# Patient Record
Sex: MALE | Race: WHITE | URBAN - NONMETROPOLITAN AREA
[De-identification: names, ages, dates, MRNs, and addresses within clinical notes are randomized per-mention and may not be internally consistent; named-entity substitution may affect disease eponyms.]

---

## 2020-03-05 ENCOUNTER — OFFICE VISIT (OUTPATIENT)
Dept: PSYCHIATRY | Age: 35
End: 2020-03-05
Payer: COMMERCIAL

## 2020-03-05 VITALS — BODY MASS INDEX: 28.98 KG/M2 | HEIGHT: 71 IN | WEIGHT: 207 LBS

## 2020-03-05 PROCEDURE — 90792 PSYCH DIAG EVAL W/MED SRVCS: CPT | Performed by: NURSE PRACTITIONER

## 2020-03-05 RX ORDER — ESCITALOPRAM OXALATE 5 MG/1
5 TABLET ORAL DAILY
Qty: 30 TABLET | Refills: 1 | Status: SHIPPED | OUTPATIENT
Start: 2020-03-05 | End: 2020-04-09 | Stop reason: SDUPTHER

## 2020-03-05 RX ORDER — ESOMEPRAZOLE MAGNESIUM 40 MG/1
40 CAPSULE, DELAYED RELEASE ORAL
COMMUNITY

## 2020-03-05 RX ORDER — HYDROXYZINE PAMOATE 25 MG/1
25 CAPSULE ORAL 3 TIMES DAILY PRN
Qty: 90 CAPSULE | Refills: 0 | Status: SHIPPED | OUTPATIENT
Start: 2020-03-05 | End: 2021-04-01

## 2020-03-05 SDOH — ECONOMIC STABILITY: TRANSPORTATION INSECURITY
IN THE PAST 12 MONTHS, HAS THE LACK OF TRANSPORTATION KEPT YOU FROM MEDICAL APPOINTMENTS OR FROM GETTING MEDICATIONS?: NO

## 2020-03-05 SDOH — ECONOMIC STABILITY: FOOD INSECURITY: WITHIN THE PAST 12 MONTHS, THE FOOD YOU BOUGHT JUST DIDN'T LAST AND YOU DIDN'T HAVE MONEY TO GET MORE.: NEVER TRUE

## 2020-03-05 SDOH — ECONOMIC STABILITY: INCOME INSECURITY: HOW HARD IS IT FOR YOU TO PAY FOR THE VERY BASICS LIKE FOOD, HOUSING, MEDICAL CARE, AND HEATING?: NOT HARD AT ALL

## 2020-03-05 SDOH — ECONOMIC STABILITY: FOOD INSECURITY: WITHIN THE PAST 12 MONTHS, YOU WORRIED THAT YOUR FOOD WOULD RUN OUT BEFORE YOU GOT MONEY TO BUY MORE.: NEVER TRUE

## 2020-03-05 SDOH — ECONOMIC STABILITY: TRANSPORTATION INSECURITY
IN THE PAST 12 MONTHS, HAS LACK OF TRANSPORTATION KEPT YOU FROM MEETINGS, WORK, OR FROM GETTING THINGS NEEDED FOR DAILY LIVING?: NO

## 2020-03-05 ASSESSMENT — ANXIETY QUESTIONNAIRES
7. FEELING AFRAID AS IF SOMETHING AWFUL MIGHT HAPPEN: 1-SEVERAL DAYS
1. FEELING NERVOUS, ANXIOUS, OR ON EDGE: 3-NEARLY EVERY DAY
6. BECOMING EASILY ANNOYED OR IRRITABLE: 1-SEVERAL DAYS
2. NOT BEING ABLE TO STOP OR CONTROL WORRYING: 1-SEVERAL DAYS
4. TROUBLE RELAXING: 0-NOT AT ALL
GAD7 TOTAL SCORE: 7
5. BEING SO RESTLESS THAT IT IS HARD TO SIT STILL: 0-NOT AT ALL
3. WORRYING TOO MUCH ABOUT DIFFERENT THINGS: 1-SEVERAL DAYS

## 2020-03-05 ASSESSMENT — PATIENT HEALTH QUESTIONNAIRE - PHQ9
5. POOR APPETITE OR OVEREATING: 0
1. LITTLE INTEREST OR PLEASURE IN DOING THINGS: 1
2. FEELING DOWN, DEPRESSED OR HOPELESS: 0
7. TROUBLE CONCENTRATING ON THINGS, SUCH AS READING THE NEWSPAPER OR WATCHING TELEVISION: 1
SUM OF ALL RESPONSES TO PHQ QUESTIONS 1-9: 4
9. THOUGHTS THAT YOU WOULD BE BETTER OFF DEAD, OR OF HURTING YOURSELF: 0
SUM OF ALL RESPONSES TO PHQ QUESTIONS 1-9: 4
8. MOVING OR SPEAKING SO SLOWLY THAT OTHER PEOPLE COULD HAVE NOTICED. OR THE OPPOSITE, BEING SO FIGETY OR RESTLESS THAT YOU HAVE BEEN MOVING AROUND A LOT MORE THAN USUAL: 0
4. FEELING TIRED OR HAVING LITTLE ENERGY: 1
6. FEELING BAD ABOUT YOURSELF - OR THAT YOU ARE A FAILURE OR HAVE LET YOURSELF OR YOUR FAMILY DOWN: 0
SUM OF ALL RESPONSES TO PHQ9 QUESTIONS 1 & 2: 1
10. IF YOU CHECKED OFF ANY PROBLEMS, HOW DIFFICULT HAVE THESE PROBLEMS MADE IT FOR YOU TO DO YOUR WORK, TAKE CARE OF THINGS AT HOME, OR GET ALONG WITH OTHER PEOPLE: 1
3. TROUBLE FALLING OR STAYING ASLEEP: 1

## 2020-03-05 NOTE — PROGRESS NOTES
[address] [place] [address]  Dept: [phone]  Dept Fax: [phone]  Loc: [phone]    Visit Date: 3/5/2020    SUBJECTIVE DATA     CHIEF COMPLAINT:    Chief Complaint   Patient presents with    Anxiety    Panic Attack    New Patient    Psychiatric Evaluation       History obtained from: patient    HISTORY OF PRESENT ILLNESS:    Dion Watts is a 37 y.o. male who presents to the office with complaints of anxiety. 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 States he gets nervous and anxious driving on the highway  -thought it wouldn't be a good fit  -he started looking for a new job  -he was feeling very overwhelmed  -he wasn't eating very well  -during this same time he and his wife were scheduled to go to [facility]. States the night prior to leaving for [facility] he was unable to sleep and was very nervous so he cancelled the trip. He doesn't have a fear of flying or traveling. He has since rescheduled a trip to [place]. States he is still nervous and afraid that he will feel the same way again. The trip is scheduled for early May 2020.  States it is \"cyclical negative thinking\"    Things worked out with his job    Then later on then he had to travel for a short period to [place]  -for several weeks prior he had a lot of anxiety  -he convinced his wife to take the trip with him  -it was at this time he started seeking counseling with [facility]    Since that trip has been completed he has had mild anxiety on a daily basis  -at times things that shouldn't cause a reaction make him nervous and uncomfortable  -still able to go to work  -still able to complete daily tasks  -hasn't been as bad as it was last fall and over the summer    The \"phobia\" with driving started with driving the morning after he drank  -he would feel very hung-over  -would feel very tired  -late teens and early 25s  -would have nightmares about loosing control or falling asleep at the wheel and crashing  -since then   -shorter distances - less than an hour - \"aren't a big deal\"  -driving down a steep slope increases anxiety  -he is able to manage to drive, but \"it is very uncomfortable\"  -he has had two panic attacks while driving requiring him to pull over. The first time was in his early 25s. He was hung over and was driving home. It was a very windy day. Found self getting more tense. He pulled over. He couldn't move his hands until he was able to calm down. The 2nd time was a couple of years ago. Had to drive to [facility]. Was doing well initially, but at one point there was a very steep hill he had to travel down. The side \"were a sheer drop off\". States he was clenching his hands again and felt like he couldn't move. States he pulled over and had a panic attack. Recalls he was \"rittled with anxiety\"  -saw Dr. Michael Galeas for the driving issue years ago, but it continues to be a problem   -he dreads driving the highway  -states the more lanes and the more hills will \"stress me out\"  -no prior accidents  -something \"got into my head that it is more risky\"    Seeing the therapist helps \"put things into perspective\"  -there is still lingering anxiety in the background    Stressors at work make the anxiety worse  -feels more overwhelmed at times with that  -states \"I think I have been managing it pretty well\"    Jami Rush is that he can prevent these anxious feelings from coming up so much to help self-correct the negative thinking cycle.      In 2019 it was \"My worst year of my life\"  -father had medical issues  -2 of his close friends had significant problems - one had his wife leave unexpectedly and the other one had an unhealthy relationship end  -wife's parents have parents      Social History     Socioeconomic History    Marital status:      Spouse name: Eden Rivas Number of children: 2    Years of education: Not on file    Highest education level:  Bachelor's degree (e.g., BA, AB, BS)   Occupational History    Occupation: [employer]     Comment: health and    Social Needs    Financial resource strain: Not hard at all   ArtCorgi insecurity:     Worry: Never true     Inability: Never true   NoteWagon needs:     Medical: No     Non-medical: No   Tobacco Use    Smoking status: Current Every Day Smoker     Packs/day: 0.50     Types: Cigarettes     Start date: 2000    Smokeless tobacco: Never Used   Substance and Sexual Activity    Alcohol use: Yes     Comment: 2 or 3 drinks on a weekend - doesn't drink chantal weekend    Drug use: Not Currently     Types: Marijuana    Sexual activity: Yes     Partners: Female   Lifestyle    Physical activity:     Days per week: Not on file     Minutes per session: Not on file    Stress: Not on file   Relationships    Social connections:     Talks on phone: Not on file     Gets together: Not on file     Attends Mormonism service: Not on file     Active member of club or organization: Not on file     Attends meetings of clubs or organizations: Not on file     Relationship status: Not on file    Intimate partner violence:     Fear of current or ex partner: Not on file     Emotionally abused: Not on file     Physically abused: Not on file     Forced sexual activity: Not on file   Other Topics Concern    Not on file   Social History Narrative    3/5/2020    LEVEL OF EDUCATION: graduated high school; earned his bachelor's degree in environmental health    SPECIAL EDUCATION NEEDS: none    RESIDENCE: Currently lives with his wife and their children    LEGAL HISTORY: None    Moravian: None    TRAUMA: None    : None    HOBBIES: music, video games, time with friends    EMPLOYMENT: currently employed full-time at [employer], or of hurting yourself in some way: Not at all  If you checked off any problems, how difficult have these problems made it for you to do your work, take care of things at home, or get along with other people?: Somewhat difficult  PHQ-9 Completed?: Complete  PHQ-9 Total Score: 4     DIAGNOSIS AND ASSESSMENT DATA     DIAGNOSIS:   1. Generalized anxiety disorder        PLAN   Follow-up:  Return in about 4 weeks (around 4/2/2020), or if symptoms worsen or fail to improve, for follow-up and medication management. Prescriptions for this encounter:  New Prescriptions    ESCITALOPRAM (LEXAPRO) 5 MG TABLET    Take 1 tablet by mouth daily    HYDROXYZINE (VISTARIL) 25 MG CAPSULE    Take 1 capsule by mouth 3 times daily as needed for Anxiety       Orders Placed This Encounter   Medications    escitalopram (LEXAPRO) 5 MG tablet     Sig: Take 1 tablet by mouth daily     Dispense:  30 tablet     Refill:  1    hydrOXYzine (VISTARIL) 25 MG capsule     Sig: Take 1 capsule by mouth 3 times daily as needed for Anxiety     Dispense:  90 capsule     Refill:  0       There are no discontinued medications. Additional orders:  No orders of the defined types were placed in this encounter. Risks, potential side effects, possibledrug-drug interactions, benefits and alternate treatments discussed in detail. All questions answered. Patient stated understanding and is agreeable to treatment plan. Patient has been instructed to seek emergency help via the emergency and/or calling 911 should symptoms become severe, worsen, or with other concerning symptoms. Patient instructed to goimmediately to the emergency room and/or call 911 with any suicidal or homicidal ideations or if audio/visual hallucinations develop  Patient stated understanding and agrees. Patient given crisis center information.     I spent a total of 60 minutes with the patient and over half of that time was spent on counselingand coordination of care regarding

## 2020-04-09 ENCOUNTER — VIRTUAL VISIT (OUTPATIENT)
Dept: PSYCHIATRY | Age: 35
End: 2020-04-09
Payer: COMMERCIAL

## 2020-04-09 PROCEDURE — 99213 OFFICE O/P EST LOW 20 MIN: CPT | Performed by: NURSE PRACTITIONER

## 2020-04-09 RX ORDER — ESCITALOPRAM OXALATE 5 MG/1
5 TABLET ORAL DAILY
Qty: 30 TABLET | Refills: 1 | Status: SHIPPED | OUTPATIENT
Start: 2020-04-09 | End: 2020-06-10 | Stop reason: DRUGHIGH

## 2020-04-09 NOTE — PROGRESS NOTES
medications:  None      Current Psychiatric Review of Systems         Yvette or Hypomania:  no     Panic Attacks:  None since last visit     Phobias:  no     Obsessions and Compulsions:  no     Body or Vocal Tics:  no     Hallucinations:  no     Delusions:  no    SOCIAL HISTORY:  Patient was born in [place], [place] and raised by his biological parents      Social History     Socioeconomic History    Marital status:      Spouse name: Lolly Bui Number of children: 2    Years of education: Not on file    Highest education level:  Bachelor's degree (e.g., BA, AB, BS)   Occupational History    Occupation: [employer]     Comment: health and    Social Needs    Financial resource strain: Not hard at all   InSample insecurity     Worry: Never true     Inability: Never true   [employer] needs     Medical: No     Non-medical: No   Tobacco Use    Smoking status: Current Every Day Smoker     Packs/day: 0.50     Types: Cigarettes     Start date: 2000    Smokeless tobacco: Never Used   Substance and Sexual Activity    Alcohol use: Yes     Comment: 2 or 3 drinks on a weekend - doesn't drink chantal weekend    Drug use: Not Currently     Types: Marijuana    Sexual activity: Yes     Partners: Female   Lifestyle    Physical activity     Days per week: Not on file     Minutes per session: Not on file    Stress: Not on file   Relationships    Social connections     Talks on phone: Not on file     Gets together: Not on file     Attends Mandaen service: Not on file     Active member of club or organization: Not on file     Attends meetings of clubs or organizations: Not on file     Relationship status: Not on file    Intimate partner violence     Fear of current or ex partner: Not on file     Emotionally abused: Not on file     Physically abused: Not on file     Forced sexual activity: Not on file   Other Topics Concern    Not on file   Social History Narrative    3/5/2020    LEVEL OF EDUCATION: graduated high school; earned his bachelor's degree in environmental health    SPECIAL EDUCATION NEEDS: none    RESIDENCE: Currently lives with his wife and their children    LEGAL HISTORY: None    Evangelical: None    TRAUMA: None    : None    HOBBIES: music, video games, time with friends    EMPLOYMENT: 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 He is contracted through [employer] at [employer]. He is working day shift. He has been with [employer] since 10/2016. Prior to that he was working at [employer] for 5 years. MARRIAGES: one. He and his wife  5/10/2010    CHILDREN: two son    SUBSTANCE USE:    1. Marijuana: first use at around age 14. Last use was around 10/2019. States he would only use once every few months. 2. Alcohol: would consume 6-8 beers per sitting once per week but not weekly previously. Doesn't tolerate alcohol. States consumption of alcohol was never problematic or disruptive. Currently only drinks once or twice a month and will consume 2-3 drinks at the most.       FAMILY HISTORY:   Family History   Problem Relation Age of Onset    Depression Mother     Osteoarthritis Father     Irritable Bowel Syndrome Father     Depression Brother        Psychiatric Family History  As noted above    PAST MEDICAL HISTORY:    Past Medical History:   Diagnosis Date    GERD (gastroesophageal reflux disease)        PAST SURGICAL HISTORY:    History reviewed. No pertinent surgical history. PREVIOUSMEDICATIONS:  Outpatient Medications Prior to Visit   Medication Sig Dispense Refill    esomeprazole (NEXIUM) 40 MG delayed release capsule Take 40 mg by mouth every morning (before breakfast)      escitalopram (LEXAPRO) 5 MG tablet Take 1 tablet by mouth daily 30 tablet 1    hydrOXYzine (VISTARIL) 25 MG capsule Take 1 capsule by mouth 3 times daily as needed for Anxiety 90 capsule 0     No facility-administered medications prior to visit.         ALLERGIES:    Patient has no known

## 2020-06-10 ENCOUNTER — VIRTUAL VISIT (OUTPATIENT)
Dept: PSYCHIATRY | Age: 35
End: 2020-06-10
Payer: COMMERCIAL

## 2020-06-10 PROCEDURE — 99214 OFFICE O/P EST MOD 30 MIN: CPT | Performed by: NURSE PRACTITIONER

## 2020-06-10 RX ORDER — ESCITALOPRAM OXALATE 10 MG/1
10 TABLET ORAL DAILY
Qty: 30 TABLET | Refills: 1 | Status: SHIPPED | OUTPATIENT
Start: 2020-06-10 | End: 2020-07-15 | Stop reason: SDUPTHER

## 2020-06-10 NOTE — PROGRESS NOTES
when originally     Sleeping okay  -had some restless leg type symptoms but has started iron and tried the hydroxyzine, both of which have helped  -overall sleeping very well       Work has been going well  -he is back in the office      Denies suicidal ideations, intent, plan. No homicidal ideations, intent, plan. No audiovisual hallucinations. HPI    Adverse reactions from psychotropic medications:  None      Current Psychiatric Review of Systems         Yvette or Hypomania:  no     Panic Attacks:  None since last visit     Phobias:  no     Obsessions and Compulsions:  no     Body or Vocal Tics:  no     Hallucinations:  no     Delusions:  no    SOCIAL HISTORY:  Patient was born in [place], [place] and raised by his biological parents      Social History     Socioeconomic History    Marital status:      Spouse name: Linda Wilkes Number of children: 2    Years of education: Not on file    Highest education level:  Bachelor's degree (e.g., BA, AB, BS)   Occupational History    Occupation: [employer]     Comment: health and    Social Needs    Financial resource strain: Not hard at all   Smish insecurity     Worry: Never true     Inability: Never true   [employer] needs     Medical: No     Non-medical: No   Tobacco Use    Smoking status: Current Every Day Smoker     Packs/day: 0.50     Types: Cigarettes     Start date: 2000    Smokeless tobacco: Never Used   Substance and Sexual Activity    Alcohol use: Yes     Comment: 2 or 3 drinks on a weekend - doesn't drink chantal weekend    Drug use: Not Currently     Types: Marijuana    Sexual activity: Yes     Partners: Female   Lifestyle    Physical activity     Days per week: Not on file     Minutes per session: Not on file    Stress: Not on file   Relationships    Social connections     Talks on phone: Not on file     Gets together: Not on file     Attends Hindu service: Not on file     Active member of club or organization: Not on file tablet Take 1 tablet by mouth daily 30 tablet 1    esomeprazole (NEXIUM) 40 MG delayed release capsule Take 40 mg by mouth every morning (before breakfast)      hydrOXYzine (VISTARIL) 25 MG capsule Take 1 capsule by mouth 3 times daily as needed for Anxiety 90 capsule 0     No facility-administered medications prior to visit. ALLERGIES:    Patient has no known allergies. REVIEW OF SYSTEMS:    Review of Systems    The patient sees CINDY Almaraz CNP as his primary care provider. SPECIALISTS: None    OBJECTIVE DATA     There were no vitals taken for this visit. Physical Exam    Mental Status Evaluation:   Orientation: Alert, oriented, thought content appropriate   Mood:. Anxious      Affect:  Normal      Appearance:  Age Appropriate, Casually Dressed, Well Dressed, Clean, Well Groomed, Clothing Appropriate for Age and Clothing Appropriate for Weather   Activity:  Cooperative, Good Eye Contact and Seated Calmly   Gait/Posture: Normal   Speech:  Clear, Fluent, Normal Pitch and Volume, Age and Situation Appropriate   Thought Process: Within Normal Limits   Thought Content: Within Normal Limits   Cognition:  Grossly Intact   Memory: Intact   Insight:  Good   Judgment: Good   Suicidal Ideations: Denies Suicidal Ideation   Homicidal Ideations: Negative for homicidal ideation   Medication Side Effects: Absent       Attention Span Attention span and concentration were age appropriate       Screenings Completed in This Encounter:     Anxiety and Depression:                    DIAGNOSIS AND ASSESSMENT DATA     DIAGNOSIS:   1. Generalized anxiety disorder        PLAN   Follow-up:  Return in about 4 weeks (around 7/8/2020), or if symptoms worsen or fail to improve, for follow-up and medication management.     Prescriptions for this encounter:  New Prescriptions    No medications on file       Orders Placed This Encounter   Medications    escitalopram (LEXAPRO) 10 MG tablet     Sig: Take 1 tablet by

## 2020-07-15 ENCOUNTER — VIRTUAL VISIT (OUTPATIENT)
Dept: PSYCHIATRY | Age: 35
End: 2020-07-15
Payer: COMMERCIAL

## 2020-07-15 PROCEDURE — 99213 OFFICE O/P EST LOW 20 MIN: CPT | Performed by: NURSE PRACTITIONER

## 2020-07-15 RX ORDER — ESCITALOPRAM OXALATE 10 MG/1
10 TABLET ORAL DAILY
Qty: 30 TABLET | Refills: 2 | Status: SHIPPED | OUTPATIENT
Start: 2020-07-15 | End: 2020-10-08 | Stop reason: SDUPTHER

## 2020-07-15 NOTE — PROGRESS NOTES
[address] [facility] [address]  Dept: [phone]  Dept Fax: [phone]: [phone]    Visit Date: 7/15/2020    TELEPSYCHIATRY VISIT -- Audio/Visual (During SMUHV-59 public health emergency)     Winifred Jarquin is a 38 y.o. male being evaluated by a Virtual Visit (video visit) encounter to address concerns as mentioned  below. A caregiver was present when appropriate. Pursuant to the emergency declaration under the [facility], [address] [place] [address] authority and the [employer] Act, this Virtual Visit was conducted with patient's (and/or legal guardian's) consent, to reduce the patient's risk of exposure to COVID-19 and provide necessary medical care. The patient (and/or legal guardian) has also been advised to contact this office for worsening conditions or problems, and seek emergency medical treatment and/or call 911 if deemed necessary. Services were provided through a video synchronous discussion virtually to substitute for in-person clinic visit. Patient and provider were located at their individual homes. SUBJECTIVE DATA     CHIEF COMPLAINT:    Chief Complaint   Patient presents with    Anxiety    Follow-up       History obtained from: patient    HISTORY OF PRESENT ILLNESS:    Winifred Jarquin is a 38 y.o. male who presents to the office via telehealth video for follow-up on complaints of anxiety. Patient's last visit was 06/10/2020.     Patient states he has had a noticeable difference in symptom control with dose adjustment  -Less racing thoughts  -better able to focus  -anxiety is \"extremely manageable\"  -Anxiety was almost a daily struggle and would make things difficult, but that isn't occurring any more  -Better able to recognize the anxiety and control the anxiety easier  -Sometimes in the background there is a little bit of anxiety, but not bothersome  -Able to control the anxiety   -Not have any difficulty eating or sleeping  -Not overthinking  -Family has noticed some improvements  -Not concerned about traveling  -Better able to drive on the highway    Work has been busier  -when this first started before the dose adjustment he struggled more  -since the dose adjustment he has been able to complete all tasks and manage all work stressors easier    Denies suicidal ideations, intent, plan. No homicidal ideations, intent, plan. No audiovisual hallucinations. HPI    Adverse reactions from psychotropic medications:  None      Current Psychiatric Review of Systems         Yvette or Hypomania:  no     Panic Attacks:  None since last visit     Phobias:  no     Obsessions and Compulsions:  no     Body or Vocal Tics:  no     Hallucinations:  no     Delusions:  no    SOCIAL HISTORY:  Patient was born in [place], [place] and raised by his biological parents      Social History     Socioeconomic History    Marital status:      Spouse name: Charlette Burch Number of children: 2    Years of education: Not on file    Highest education level:  Bachelor's degree (e.g., BA, AB, BS)   Occupational History    Occupation: [employer]     Comment: health and    Social Needs    Financial resource strain: Not hard at all   [employer] insecurity     Worry: Never true     Inability: Never true   [employer] needs     Medical: No     Non-medical: No   Tobacco Use    Smoking status: Current Every Day Smoker     Packs/day: 0.50     Types: Cigarettes     Start date: 2000    Smokeless tobacco: Never Used   Substance and Sexual Activity    Alcohol use: Yes     Comment: 2 or 3 drinks on a weekend - doesn't drink chantal weekend    Drug use: Not Currently     Types: Marijuana    Sexual activity: Yes     Partners: Female   Lifestyle    Physical activity     Days per week: Not on file     Minutes per session: Not on file    Stress: Not on file Relationships    Social connections     Talks on phone: Not on file     Gets together: Not on file     Attends Taoism service: Not on file     Active member of club or organization: Not on file     Attends meetings of clubs or organizations: Not on file     Relationship status: Not on file    Intimate partner violence     Fear of current or ex partner: Not on file     Emotionally abused: Not on file     Physically abused: Not on file     Forced sexual activity: Not on file   Other Topics Concern    Not on file   Social History Narrative    3/5/2020    LEVEL OF EDUCATION: graduated high school; earned his bachelor's degree in environmental health    SPECIAL EDUCATION NEEDS: none    RESIDENCE: Currently lives with his wife and their children    LEGAL HISTORY: None    Taoism: None    TRAUMA: None    : None    HOBBIES: music, video games, time with friends    EMPLOYMENT: 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 He is contracted through [employer] at [employer]. He is working day shift. He has been with [employer] since 10/2016. Prior to that he was working at [employer] for 5 years. MARRIAGES: one. He and his wife  5/10/2010    CHILDREN: two son    SUBSTANCE USE:    1. Marijuana: first use at around age 14. Last use was around 10/2019. States he would only use once every few months. 2. Alcohol: would consume 6-8 beers per sitting once per week but not weekly previously. Doesn't tolerate alcohol. States consumption of alcohol was never problematic or disruptive.  Currently only drinks once or twice a month and will consume 2-3 drinks at the most.       FAMILY HISTORY:   Family History   Problem Relation Age of Onset    Depression Mother     Osteoarthritis Father     Irritable Bowel Syndrome Father     Depression Brother        Psychiatric Family History  As noted above    PAST MEDICAL HISTORY:    Past Medical History:   Diagnosis Date    GERD (gastroesophageal reflux disease)        PAST SURGICAL HISTORY:    History reviewed. No pertinent surgical history. PREVIOUSMEDICATIONS:  Outpatient Medications Prior to Visit   Medication Sig Dispense Refill    esomeprazole (NEXIUM) 40 MG delayed release capsule Take 40 mg by mouth every morning (before breakfast)      hydrOXYzine (VISTARIL) 25 MG capsule Take 1 capsule by mouth 3 times daily as needed for Anxiety 90 capsule 0    escitalopram (LEXAPRO) 10 MG tablet Take 1 tablet by mouth daily 30 tablet 1     No facility-administered medications prior to visit. ALLERGIES:    Patient has no known allergies. REVIEW OF SYSTEMS:    Review of Systems    The patient sees CINDY Benoit CNP as his primary care provider. SPECIALISTS: None    OBJECTIVE DATA     There were no vitals taken for this visit. Physical Exam    Mental Status Evaluation:   Orientation: Alert, oriented, thought content appropriate   Mood:. Euthymic      Affect:  Normal      Appearance:  Age Appropriate, Casually Dressed, Well Dressed, Clean, Well Groomed, Clothing Appropriate for Age and Clothing Appropriate for Weather   Activity:  Cooperative, Good Eye Contact and Seated Calmly   Gait/Posture: Normal   Speech:  Clear, Fluent, Normal Pitch and Volume, Age and Situation Appropriate   Thought Process: Within Normal Limits   Thought Content: Within Normal Limits   Cognition:  Grossly Intact   Memory: Intact   Insight:  Good   Judgment: Good   Suicidal Ideations: Denies Suicidal Ideation   Homicidal Ideations: Negative for homicidal ideation   Medication Side Effects: Absent       Attention Span Attention span and concentration were age appropriate       Screenings Completed in This Encounter:     Anxiety and Depression:                    DIAGNOSIS AND ASSESSMENT DATA     DIAGNOSIS:   1.  Generalized anxiety disorder        PLAN   Follow-up:  Return in about 3 months (around 10/15/2020), or if symptoms worsen or fail to improve, for follow-up and medication management. Prescriptions for this encounter:  New Prescriptions    No medications on file       Orders Placed This Encounter   Medications    escitalopram (LEXAPRO) 10 MG tablet     Sig: Take 1 tablet by mouth daily     Dispense:  30 tablet     Refill:  2       Medications Discontinued During This Encounter   Medication Reason    escitalopram (LEXAPRO) 10 MG tablet REORDER       Additional orders:  No orders of the defined types were placed in this encounter. Patient mood is doing very well per his report. He is tolerating medication without side effect and has noticed significant reduction in overall symptoms. Patient will continue with Lexapro 10 mg daily. Supportive therapy provided. Discussed the importance of participating in individual psychotherapy. Patient is encouraged to utilize nonpharmacologic coping skills such as deep breathing, guided imagery, guided meditation, muscle relaxation, calming music, and/or journaling. Risks, potential side effects, possibledrug-drug interactions, benefits and alternate treatments discussed in detail. All questions answered. Patient stated understanding and is agreeable to treatment plan. Patient has been instructed to seek emergency help via the emergency and/or calling 911 should symptoms become severe, worsen, or with other concerning symptoms. Patient instructed to goimmediately to the emergency room and/or call 911 with any suicidal or homicidal ideations or if audio/visual hallucinations develop  Patient stated understanding and agrees. Patient given crisis center information. I spent a total of 20 minutes with the patient and over half of that time was spent on counseling and coordination of care regarding topics discussed above. Provider Signature:  Electronically signed by CINDY Rincon CNP on 7/15/2020 at 12:05 PM    **This report has been created using voice recognition software.  It may contain minor errors which are inherent in voice recognition technology. **

## 2020-10-08 ENCOUNTER — VIRTUAL VISIT (OUTPATIENT)
Dept: PSYCHIATRY | Age: 35
End: 2020-10-08
Payer: COMMERCIAL

## 2020-10-08 PROCEDURE — 99213 OFFICE O/P EST LOW 20 MIN: CPT | Performed by: NURSE PRACTITIONER

## 2020-10-08 RX ORDER — ESCITALOPRAM OXALATE 10 MG/1
10 TABLET ORAL DAILY
Qty: 90 TABLET | Refills: 1 | Status: SHIPPED | OUTPATIENT
Start: 2020-10-08 | End: 2020-10-21 | Stop reason: SDUPTHER

## 2020-10-08 NOTE — PROGRESS NOTES
[address] [place] [address]  Dept: [phone]  Dept Fax: [phone]: [phone]    Visit Date: 10/8/2020    TELEPSYCHIATRY VISIT -- Audio/Visual (During AAFBN-55 public health emergency)     Charu Chavez is a 38 y.o. male being evaluated by a Virtual Visit (video visit) encounter to address concerns as mentioned  below. A caregiver was present when appropriate. Pursuant to the emergency declaration under the [address] [place] [address], [address] [place] [address] authority and the [employer] Act, this Virtual Visit was conducted with patient's (and/or legal guardian's) consent, to reduce the patient's risk of exposure to COVID-19 and provide necessary medical care. The patient (and/or legal guardian) has also been advised to contact this office for worsening conditions or problems, and seek emergency medical treatment and/or call 911 if deemed necessary. Services were provided through a video synchronous discussion virtually to substitute for in-person clinic visit. Patient was at his home and provider was at the office. SUBJECTIVE DATA     CHIEF COMPLAINT:    Chief Complaint   Patient presents with    Anxiety    Follow-up       History obtained from: patient    HISTORY OF PRESENT ILLNESS:    Charu Chavez is a 38 y.o. male who presents to the office via telehealth video for follow-up on complaints of anxiety. Patient's last visit was 07/15/2020.     Patient states he is doing very well overall.  -No anxiety  -states \"I feel normal\"  -Mood is 9/10 with 10 as best mood possible  -States \"I can enjoy life again and not excessively worry or think about things\"  -Lexapro is working well  -good focus/concentration  -denies excessive worry, racing thoughts    Will be canceling upcoming trip to [facility] due to [employer] wave of COVID-19\"    Work is going well -\"things have calmed down\"  -\"things are pretty chill\"    Denies suicidal ideations, intent, plan. No homicidal ideations, intent, plan. No audiovisual hallucinations. HPI    Adverse reactions from psychotropic medications:  None      Current Psychiatric Review of Systems         Yvette or Hypomania:  no     Panic Attacks:  None since last visit     Phobias:  no     Obsessions and Compulsions:  no     Body or Vocal Tics:  no     Hallucinations:  no     Delusions:  no    SOCIAL HISTORY:  Patient was born in [place], [place] and raised by his biological parents      Social History     Socioeconomic History    Marital status:      Spouse name: Pretty Jean Number of children: 2    Years of education: Not on file    Highest education level:  Bachelor's degree (e.g., BA, AB, BS)   Occupational History    Occupation: [employer]     Comment: health and    Social Needs    Financial resource strain: Not hard at all   [employer] insecurity     Worry: Never true     Inability: Never true   [employer] needs     Medical: No     Non-medical: No   Tobacco Use    Smoking status: Current Every Day Smoker     Packs/day: 1.00     Types: Cigarettes     Start date: 2000    Smokeless tobacco: Never Used   Substance and Sexual Activity    Alcohol use: Yes     Comment: 2 or 3 drinks on a weekend - doesn't drink chantal weekend    Drug use: Not Currently     Types: Marijuana    Sexual activity: Yes     Partners: Female   Lifestyle    Physical activity     Days per week: Not on file     Minutes per session: Not on file    Stress: Not on file   Relationships    Social connections     Talks on phone: Not on file     Gets together: Not on file     Attends Restorationism service: Not on file     Active member of club or organization: Not on file     Attends meetings of clubs or organizations: Not on file     Relationship status: Not on file    Intimate partner violence     Fear of current or ex partner: Not on file Emotionally abused: Not on file     Physically abused: Not on file     Forced sexual activity: Not on file   Other Topics Concern    Not on file   Social History Narrative    3/5/2020    LEVEL OF EDUCATION: graduated high school; earned his bachelor's degree in environmental health    SPECIAL EDUCATION NEEDS: none    RESIDENCE: Currently lives with his wife and their children    LEGAL HISTORY: None    Cheondoism: None    TRAUMA: None    : None    HOBBIES: music, video games, time with friends    EMPLOYMENT: 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 He is contracted through [employer] at [employer]. He is working day shift. He has been with [employer] since 10/2016. Prior to that he was working at [employer] for 5 years. MARRIAGES: one. He and his wife  5/10/2010    CHILDREN: two son    SUBSTANCE USE:    1. Marijuana: first use at around age 14. Last use was around 10/2019. States he would only use once every few months. 2. Alcohol: would consume 6-8 beers per sitting once per week but not weekly previously. Doesn't tolerate alcohol. States consumption of alcohol was never problematic or disruptive. Currently only drinks once or twice a month and will consume 2-3 drinks at the most.       FAMILY HISTORY:   Family History   Problem Relation Age of Onset    Depression Mother     Osteoarthritis Father     Irritable Bowel Syndrome Father     Depression Brother        Psychiatric Family History  As noted above    PAST MEDICAL HISTORY:    Past Medical History:   Diagnosis Date    GERD (gastroesophageal reflux disease)        PAST SURGICAL HISTORY:    History reviewed. No pertinent surgical history.     PREVIOUSMEDICATIONS:  Outpatient Medications Prior to Visit   Medication Sig Dispense Refill    esomeprazole (NEXIUM) 40 MG delayed release capsule Take 40 mg by mouth every morning (before breakfast)      hydrOXYzine (VISTARIL) 25 MG capsule Take 1 capsule by mouth 3 times daily as needed for Anxiety 90 capsule 0    escitalopram (LEXAPRO) 10 MG tablet Take 1 tablet by mouth daily 30 tablet 2     No facility-administered medications prior to visit. ALLERGIES:    Patient has no known allergies. REVIEW OF SYSTEMS:    Review of Systems    The patient sees CINDY Herrera CNP as his primary care provider. SPECIALISTS: None    OBJECTIVE DATA     There were no vitals taken for this visit. Physical Exam    Mental Status Evaluation:   Orientation: Alert, oriented, thought content appropriate   Mood:. Euthymic      Affect:  Normal      Appearance:  Age Appropriate, Casually Dressed, Well Dressed, Clean, Well Groomed, Clothing Appropriate for Age and Clothing Appropriate for Weather   Activity:  Cooperative, Good Eye Contact and Seated Calmly   Gait/Posture: Normal   Speech:  Clear, Fluent, Normal Pitch and Volume, Age and Situation Appropriate   Thought Process: Within Normal Limits   Thought Content: Within Normal Limits   Cognition:  Grossly Intact   Memory: Intact   Insight:  Good   Judgment: Good   Suicidal Ideations: Denies Suicidal Ideation   Homicidal Ideations: Negative for homicidal ideation   Medication Side Effects: Absent       Attention Span Attention span and concentration were age appropriate       Screenings Completed in This Encounter:     Anxiety and Depression:                    DIAGNOSIS AND ASSESSMENT DATA     DIAGNOSIS:   1. Generalized anxiety disorder        PLAN   Follow-up:  Return in about 6 months (around 4/8/2021), or if symptoms worsen or fail to improve, for follow-up and medication management.     Prescriptions for this encounter:  New Prescriptions    No medications on file       Orders Placed This Encounter   Medications    escitalopram (LEXAPRO) 10 MG tablet     Sig: Take 1 tablet by mouth daily     Dispense:  90 tablet     Refill:  1       Medications Discontinued During This Encounter   Medication Reason    escitalopram (LEXAPRO) 10 MG tablet REORDER       Additional orders:  No orders of the defined types were placed in this encounter. Patient mood is doing very well per his report. He is tolerating medication without side effect and has noticed significant reduction in overall symptoms. Supportive therapy provided. Discussed the importance of participating in individual psychotherapy. Patient is encouraged to utilize nonpharmacologic coping skills such as deep breathing, guided imagery, guided meditation, muscle relaxation, calming music, and/or journaling. Risks, potential side effects, possibledrug-drug interactions, benefits and alternate treatments discussed in detail. All questions answered. Patient stated understanding and is agreeable to treatment plan. Patient has been instructed to seek emergency help via the emergency and/or calling 911 should symptoms become severe, worsen, or with other concerning symptoms. Patient instructed to goimmediately to the emergency room and/or call 911 with any suicidal or homicidal ideations or if audio/visual hallucinations develop  Patient stated understanding and agrees. Patient given crisis center information. I spent a total of 20 minutes with the patient and over half of that time was spent on counseling and coordination of care regarding topics discussed above. Provider Signature:  Electronically signed by CINDY Griffith CNP on 10/8/2020 at 11:48 AM    **This report has been created using voice recognition software. It may contain minor errors which are inherent in voice recognition technology. **

## 2020-10-19 NOTE — TELEPHONE ENCOUNTER
[facility] has requested a 90 day refill of Lexapro 10mg/d. On Gagan's behalf. He attended an appointment 10/8 and is to return 4/1/21.     The last 90 day order was submitted to [facility] on 10/8

## 2020-10-21 RX ORDER — ESCITALOPRAM OXALATE 10 MG/1
10 TABLET ORAL DAILY
Qty: 90 TABLET | Refills: 1 | Status: SHIPPED | OUTPATIENT
Start: 2020-10-21 | End: 2021-04-01 | Stop reason: SDUPTHER

## 2021-03-24 ENCOUNTER — IMMUNIZATION (OUTPATIENT)
Dept: PRIMARY CARE CLINIC | Age: 36
End: 2021-03-24
Payer: COMMERCIAL

## 2021-03-24 PROCEDURE — 91300 COVID-19, PFIZER VACCINE 30MCG/0.3ML DOSE: CPT | Performed by: FAMILY MEDICINE

## 2021-03-24 PROCEDURE — 0001A COVID-19, PFIZER VACCINE 30MCG/0.3ML DOSE: CPT | Performed by: FAMILY MEDICINE

## 2021-04-01 ENCOUNTER — OFFICE VISIT (OUTPATIENT)
Dept: PSYCHIATRY | Age: 36
End: 2021-04-01
Payer: COMMERCIAL

## 2021-04-01 DIAGNOSIS — F41.1 GENERALIZED ANXIETY DISORDER: Primary | ICD-10-CM

## 2021-04-01 PROCEDURE — 99213 OFFICE O/P EST LOW 20 MIN: CPT | Performed by: NURSE PRACTITIONER

## 2021-04-01 RX ORDER — ESCITALOPRAM OXALATE 10 MG/1
10 TABLET ORAL DAILY
Qty: 90 TABLET | Refills: 1 | Status: SHIPPED | OUTPATIENT
Start: 2021-04-01 | End: 2021-07-20 | Stop reason: SDUPTHER

## 2021-04-01 NOTE — PROGRESS NOTES
[address] [place] [address]  Dept: [phone]  Dept Fax: [phone]  Loc: [phone]    Visit Date: 4/1/2021    SUBJECTIVE DATA     CHIEF COMPLAINT:    Chief Complaint   Patient presents with    Anxiety    Follow-up       History obtained from: patient    HISTORY OF PRESENT ILLNESS:    Dylan Mireles is a 38 y.o. male who presents to the office via telehealth video for follow-up on complaints of anxiety. Patient's last visit was 10/08/2020. Still doing \"really very well\"  Finds the Lexapro to be very helpful  -states it has worked very well  -states \"it has been a blessing\"  -denies anxiety symptoms  -denies excessive worry  -denies racing thoughts  -able to manage stressors well    States everything is going very well  Productive at work and doing well    Home life is going well    Parents are doing well    Work is going very well    Sleeping well       Anxiety is overall well controlled  -on occasion can sense that he is having anxiety but it is not bothersome    Denies suicidal ideations, intent, plan. No homicidal ideations, intent, plan. No audiovisual hallucinations. HPI    Adverse reactions from psychotropic medications:  None      Current Psychiatric Review of Systems         Yvette or Hypomania:  no     Panic Attacks:  None since last visit     Phobias:  no     Obsessions and Compulsions:  no     Body or Vocal Tics:  no     Hallucinations:  no     Delusions:  no    SOCIAL HISTORY:  Patient was born in [place], [place] and raised by his biological parents      Social History     Socioeconomic History    Marital status:      Spouse name: Nely Kohler Number of children: 2    Years of education: Not on file    Highest education level:  Bachelor's degree (e.g., BA, AB, BS)   Occupational History    Occupation: [employer]     Comment: health and    Social Needs    Financial resource strain: Not hard at all    Food insecurity     Worry: Never true     Inability: Never true    Transportation needs     Medical: No     Non-medical: No   Tobacco Use    Smoking status: Current Every Day Smoker     Packs/day: 0.75     Types: Cigarettes     Start date: 2000    Smokeless tobacco: Never Used   Substance and Sexual Activity    Alcohol use: Yes     Comment: 2 or 3 drinks on a weekend - doesn't drink chantal weekend    Drug use: Not Currently     Types: Marijuana    Sexual activity: Yes     Partners: Female   Lifestyle    Physical activity     Days per week: Not on file     Minutes per session: Not on file    Stress: Not on file   Relationships    Social connections     Talks on phone: Not on file     Gets together: Not on file     Attends Bahai service: Not on file     Active member of club or organization: Not on file     Attends meetings of clubs or organizations: Not on file     Relationship status: Not on file    Intimate partner violence     Fear of current or ex partner: Not on file     Emotionally abused: Not on file     Physically abused: Not on file     Forced sexual activity: Not on file   Other Topics Concern    Not on file   Social History Narrative    04/01/2021    LEVEL OF EDUCATION: graduated high school; earned his bachelor's degree in environmental health    SPECIAL EDUCATION NEEDS: none    RESIDENCE: Currently lives with his wife and their children    LEGAL HISTORY: None    Zoroastrian: None    TRAUMA: None    : None    HOBBIES: music, video games, time with friends    EMPLOYMENT: 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 He is contracted through [employer] at [employer]. He is working day shift. He has been with [employer] since 10/2016. Prior to that he was working at [employer] for 5 years. MARRIAGES: one. He and his wife  5/10/2010    CHILDREN: two sons    SUBSTANCE USE:    1. Marijuana: first use at around age 14. Last use was around 10/2019.  States he would only use once every few months. 2. Alcohol: would consume 6-8 beers per sitting once per week but not weekly previously. Doesn't tolerate alcohol. States consumption of alcohol was never problematic or disruptive. Currently only drinks once or twice a month and will consume 2-3 drinks at the most.       FAMILY HISTORY:   Family History   Problem Relation Age of Onset    Depression Mother     Osteoarthritis Father     Irritable Bowel Syndrome Father     Depression Brother        Psychiatric Family History  As noted above    PAST MEDICAL HISTORY:    Past Medical History:   Diagnosis Date    GERD (gastroesophageal reflux disease)        PAST SURGICAL HISTORY:    History reviewed. No pertinent surgical history. PREVIOUSMEDICATIONS:  Outpatient Medications Prior to Visit   Medication Sig Dispense Refill    esomeprazole (NEXIUM) 40 MG delayed release capsule Take 40 mg by mouth every morning (before breakfast)      escitalopram (LEXAPRO) 10 MG tablet Take 1 tablet by mouth daily 90 tablet 1    hydrOXYzine (VISTARIL) 25 MG capsule Take 1 capsule by mouth 3 times daily as needed for Anxiety 90 capsule 0     No facility-administered medications prior to visit. ALLERGIES:    Patient has no known allergies. REVIEW OF SYSTEMS:    Review of Systems    The patient sees CINDY Llanes CNP as his primary care provider. SPECIALISTS: None    OBJECTIVE DATA     There were no vitals taken for this visit. Physical Exam    Mental Status Evaluation:   Orientation: Alert, oriented, thought content appropriate   Mood:. Euthymic      Affect:  Normal      Appearance:  Age Appropriate, Casually Dressed, Well Dressed, Clean, Well Groomed, Clothing Appropriate for Age and Clothing Appropriate for Weather   Activity:  Cooperative, Good Eye Contact and Seated Calmly   Gait/Posture: Normal   Speech:  Clear, Fluent, Normal Pitch and Volume, Age and Situation Appropriate   Thought Process:   Within Normal Limits or with other concerning symptoms. Patient instructed to goimmediately to the emergency room and/or call 911 with any suicidal or homicidal ideations or if audio/visual hallucinations develop  Patient stated understanding and agrees. Patient given crisis center information. Provider Signature:  Electronically signed by CINDY Faulkner CNP on 4/1/2021 at 4:05 PM    **This report has been created using voice recognition software. It may contain minor errors which are inherent in voice recognition technology. **

## 2021-04-14 ENCOUNTER — IMMUNIZATION (OUTPATIENT)
Dept: PRIMARY CARE CLINIC | Age: 36
End: 2021-04-14
Payer: COMMERCIAL

## 2021-04-14 PROCEDURE — 0002A COVID-19, PFIZER VACCINE 30MCG/0.3ML DOSE: CPT | Performed by: PHARMACIST

## 2021-04-14 PROCEDURE — 91300 COVID-19, PFIZER VACCINE 30MCG/0.3ML DOSE: CPT | Performed by: PHARMACIST

## 2021-07-20 RX ORDER — ESCITALOPRAM OXALATE 10 MG/1
10 TABLET ORAL DAILY
Qty: 90 TABLET | Refills: 0 | Status: SHIPPED | OUTPATIENT
Start: 2021-07-20 | End: 2021-10-12 | Stop reason: SDUPTHER

## 2021-07-20 NOTE — TELEPHONE ENCOUNTER
[facility] has requested a 90 day refill of Lexapro 10mg/d on Gagan's behalf. He attended an appointment 4/1 and is to return 10/7. Pt was prescribed 90 day supply with one refill of this medication on 4/1/21 to his local pharmacy.

## 2021-10-12 ENCOUNTER — VIRTUAL VISIT (OUTPATIENT)
Dept: PSYCHIATRY | Age: 36
End: 2021-10-12
Payer: COMMERCIAL

## 2021-10-12 DIAGNOSIS — F41.1 GENERALIZED ANXIETY DISORDER: Primary | ICD-10-CM

## 2021-10-12 PROCEDURE — 99213 OFFICE O/P EST LOW 20 MIN: CPT | Performed by: NURSE PRACTITIONER

## 2021-10-12 RX ORDER — ESCITALOPRAM OXALATE 10 MG/1
10 TABLET ORAL DAILY
Qty: 90 TABLET | Refills: 1 | Status: SHIPPED | OUTPATIENT
Start: 2021-10-12

## 2021-10-12 NOTE — PROGRESS NOTES
occasional diarrhea, but this occurs infrequently and is tolerable    States \"everything is really good with me\"    Denies suicidal ideations, intent, plan. No homicidal ideations, intent, plan. No audiovisual hallucinations. HPI    Adverse reactions from psychotropic medications:  None      Current Psychiatric Review of Systems         Yvette or Hypomania:  no     Panic Attacks:  None since last visit     Phobias:  no     Obsessions and Compulsions:  no     Body or Vocal Tics:  no     Hallucinations:  no     Delusions:  no    SOCIAL HISTORY:  Patient was born in [place], [place] and raised by his biological parents      Social History     Socioeconomic History    Marital status:      Spouse name: Vesna Boggs Number of children: 2    Years of education: Not on file    Highest education level: Bachelor's degree (e.g., BA, AB, BS)   Occupational History    Occupation: [employer]     Comment: health and    Tobacco Use    Smoking status: Current Every Day Smoker     Packs/day: 0.75     Types: Cigarettes     Start date: 2000    Smokeless tobacco: Never Used   Vaping Use    Vaping Use: Never used   Substance and Sexual Activity    Alcohol use: Yes     Comment: 2 or 3 drinks on a weekend - doesn't drink chantal weekend    Drug use: Not Currently     Types: Marijuana    Sexual activity: Yes     Partners: Female   Other Topics Concern    Not on file   Social History Narrative    04/01/2021    LEVEL OF EDUCATION: graduated high school; earned his bachelor's degree in environmental health    SPECIAL EDUCATION NEEDS: none    RESIDENCE: Currently lives with his wife and their children    LEGAL HISTORY: None    Voodoo: None    TRAUMA: None    : None    HOBBIES: music, video games, time with friends    EMPLOYMENT: 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 He is working day shift. He has been with [employer] since 10/2016.  Prior to that he was working at "[employer] for 5 years. MARRIAGES: one. He and his wife  5/10/2010    CHILDREN: two sons    SUBSTANCE USE:    1. Marijuana: first use at around age 14. Last use was around 10/2019. States he would only use once every few months. 2. Alcohol: would consume 6-8 beers per sitting once per week but not weekly previously. Doesn't tolerate alcohol. States consumption of alcohol was never problematic or disruptive. Currently only drinks once or twice a month and will consume 2-3 drinks at the most.     Social Determinants of Health     Financial Resource Strain:     Difficulty of Paying Living Expenses:    Food Insecurity:     Worried About Running Out of Food in the Last Year:     [address] in the Last Year:    Transportation Needs:     Lack of Transportation (Medical):  Lack of Transportation (Non-Medical):    Physical Activity:     Days of Exercise per Week:     Minutes of Exercise per Session:    Stress:     Feeling of Stress :    Social Connections:     Frequency of Communication with Friends and Family:     Frequency of Social Gatherings with Friends and Family:     Attends Episcopalian Services:     Active Member of Clubs or Organizations:     Attends Club or Organization Meetings:     Marital Status:    Intimate Partner Violence:     Fear of Current or Ex-Partner:     Emotionally Abused:     Physically Abused:     Sexually Abused:        FAMILY HISTORY:   Family History   Problem Relation Age of Onset    Depression Mother     Osteoarthritis Father     Irritable Bowel Syndrome Father     Depression Brother        Psychiatric Family History  As noted above    PAST MEDICAL HISTORY:    Past Medical History:   Diagnosis Date    GERD (gastroesophageal reflux disease)        PAST SURGICAL HISTORY:    History reviewed. No pertinent surgical history.     PREVIOUSMEDICATIONS:  Outpatient Medications Prior to Visit   Medication Sig Dispense Refill    esomeprazole (NEXIUM) 40 MG delayed release capsule Take 40 mg by mouth every morning (before breakfast)      escitalopram (LEXAPRO) 10 MG tablet Take 1 tablet by mouth daily 90 tablet 0     No facility-administered medications prior to visit. ALLERGIES:    Patient has no known allergies. REVIEW OF SYSTEMS:    Review of Systems    The patient sees CINDY Bueno CNP as his primary care provider. SPECIALISTS: None    OBJECTIVE DATA     There were no vitals taken for this visit. Physical Exam    Mental Status Evaluation:   Orientation: Alert, oriented, thought content appropriate   Mood:. Euthymic      Affect:  Normal      Appearance:  Age Appropriate, Casually Dressed, Well Dressed, Clean, Well Groomed, Clothing Appropriate for Age and Clothing Appropriate for Weather   Activity:  Cooperative, Good Eye Contact and Seated Calmly   Gait/Posture: Normal   Speech:  Clear, Fluent, Normal Pitch and Volume, Age and Situation Appropriate   Thought Process: Within Normal Limits   Thought Content: Within Normal Limits   Cognition:  Grossly Intact   Memory: Intact   Insight:  Good   Judgment: Good   Suicidal Ideations: Denies Suicidal Ideation   Homicidal Ideations: Negative for homicidal ideation   Medication Side Effects: Absent       Attention Span Attention span and concentration were age appropriate       Screenings Completed in This Encounter:     Anxiety and Depression:                    DIAGNOSIS AND ASSESSMENT DATA     DIAGNOSIS:   1. Generalized anxiety disorder        PLAN   Follow-up:  Return in about 6 months (around 4/12/2022), or if symptoms worsen or fail to improve, for follow-up and medication management.     Prescriptions for this encounter:  New Prescriptions    No medications on file       Orders Placed This Encounter   Medications    escitalopram (LEXAPRO) 10 MG tablet     Sig: Take 1 tablet by mouth daily     Dispense:  90 tablet     Refill:  1       Medications Discontinued During This Encounter   Medication Reason    escitalopram (LEXAPRO) 10 MG tablet REORDER       Additional orders:  No orders of the defined types were placed in this encounter. Patient mood is doing very well per his report. He is tolerating medication without side effect and has noticed significant reduction in overall symptoms. Supportive therapy provided. Patient mood is doing well and he is tolerating medication well; patient may return to PCP for continuation of his medication. If symptoms worsen or other mood disruption occurs patient may return to this office/provider. Patient is encouraged to utilize nonpharmacologic coping skills such as deep breathing, guided imagery, guided meditation, muscle relaxation, calming music, and/or journaling. Risks, potential side effects, possibledrug-drug interactions, benefits and alternate treatments discussed in detail. All questions answered. Patient stated understanding and is agreeable to treatment plan. Patient has been instructed to seek emergency help via the emergency and/or calling 911 should symptoms become severe, worsen, or with other concerning symptoms. Patient instructed to goimmediately to the emergency room and/or call 911 with any suicidal or homicidal ideations or if audio/visual hallucinations develop  Patient stated understanding and agrees. Patient given crisis center information. Provider Signature:  Electronically signed by CINDY Tovar CNP on 10/12/2021 at 11:39 AM    **This report has been created using voice recognition software. It may contain minor errors which are inherent in voice recognition technology. **

## 2021-10-12 NOTE — LETTER
[address] [facility]  [address]  [facility] [address]  Phone: [phone]  Fax: [phone] [facility], APRN - CNP        October 24, 2021    Bennie Peres, APRN - CNP  [address] [facility] [address]    Patient: Doreen Black   MR Number: [id number]   YOB: 1985   Date of Visit: 10/12/2021     Dear Bennie Peres,    I recently saw your patient, Doreen Black, for the evaluation of anxiety. Below are the relevant portions of my assessment and plan of care. DIAGNOSIS AND ASSESSMENT DATA     DIAGNOSIS:   1. Generalized anxiety disorder        PLAN   Follow-up:  Return in about 6 months (around 4/12/2022), or if symptoms worsen or fail to improve, for follow-up and medication management. Prescriptions for this encounter:  New Prescriptions    No medications on file       Orders Placed This Encounter   Medications    escitalopram (LEXAPRO) 10 MG tablet     Sig: Take 1 tablet by mouth daily     Dispense:  90 tablet     Refill:  1       Medications Discontinued During This Encounter   Medication Reason    escitalopram (LEXAPRO) 10 MG tablet REORDER       Additional orders:  No orders of the defined types were placed in this encounter. Patient mood is doing very well per his report. He is tolerating medication without side effect and has noticed significant reduction in overall symptoms. Supportive therapy provided. Discussed the importance of participating in individual psychotherapy. Patient is encouraged to utilize nonpharmacologic coping skills such as deep breathing, guided imagery, guided meditation, muscle relaxation, calming music, and/or journaling. Risks, potential side effects, possibledrug-drug interactions, benefits and alternate treatments discussed in detail. All questions answered. Patient stated understanding and is agreeable to treatment plan. If you have questions, please do not hesitate to call me.  I look forward to following Sonia Shepard along with you.     Sincerely,      Deb Stock, CINDY - CNP

## 2025-04-21 ENCOUNTER — TELEPHONE (OUTPATIENT)
Dept: PSYCHIATRY | Age: 40
End: 2025-04-21

## 2025-04-21 NOTE — TELEPHONE ENCOUNTER
Gagan called into the office stating that he would like to get the recommendation from this provider on his increased anxiety since Friday (04/18/25). He said that he has been experiencing some recent work stressors which he feels has been contributing to his anxiety but is unsure if his Lexapro is still working. Mentioned that he is currently on Lexapro 10mg/daily but has been doubling it since Friday. He is not actively in counseling.    Per chart; he has not been seen since 2021. He said that his PCP has been managing his Lexapro but would be interested in getting scheduled with this provider as well in the meantime.     Staff encouraged him to reach out to his PCP to go over his recent concerns; mentioned that he would be considered a new patient to this provider since its been over 3 years. He said that he would still like to get scheduled with this provider if able but he would reach out to his PCP in the meantime. Said that his PCP recently switched (unable to remember the name of his provider) but its within [facility] in [place].     Please advise anything further.

## 2025-04-24 ENCOUNTER — HOSPITAL ENCOUNTER (EMERGENCY)
Age: 40
Discharge: HOME | End: 2025-04-24
Attending: EMERGENCY MEDICINE
Payer: COMMERCIAL

## 2025-04-24 VITALS
RESPIRATION RATE: 17 BRPM | BODY MASS INDEX: 31.08 KG/M2 | TEMPERATURE: 98.1 F | DIASTOLIC BLOOD PRESSURE: 107 MMHG | HEIGHT: 71 IN | WEIGHT: 222 LBS | OXYGEN SATURATION: 100 % | HEART RATE: 78 BPM | SYSTOLIC BLOOD PRESSURE: 146 MMHG

## 2025-04-24 DIAGNOSIS — F43.9 STRESS: ICD-10-CM

## 2025-04-24 DIAGNOSIS — F41.9 ANXIETY: Primary | ICD-10-CM

## 2025-04-24 DIAGNOSIS — Z53.21 ELOPED FROM EMERGENCY DEPARTMENT: ICD-10-CM

## 2025-04-24 PROCEDURE — 99283 EMERGENCY DEPT VISIT LOW MDM: CPT

## 2025-04-24 PROCEDURE — 6370000000 HC RX 637 (ALT 250 FOR IP): Performed by: EMERGENCY MEDICINE

## 2025-04-24 RX ORDER — HYDROXYZINE HYDROCHLORIDE 10 MG/1
25 TABLET, FILM COATED ORAL ONCE
Status: COMPLETED | OUTPATIENT
Start: 2025-04-24 | End: 2025-04-24

## 2025-04-24 RX ADMIN — HYDROXYZINE HYDROCHLORIDE 25 MG: 10 TABLET ORAL at 08:23

## 2025-04-24 ASSESSMENT — PATIENT HEALTH QUESTIONNAIRE - PHQ9
8. MOVING OR SPEAKING SO SLOWLY THAT OTHER PEOPLE COULD HAVE NOTICED. OR THE OPPOSITE, BEING SO FIGETY OR RESTLESS THAT YOU HAVE BEEN MOVING AROUND A LOT MORE THAN USUAL: MORE THAN HALF THE DAYS
SUM OF ALL RESPONSES TO PHQ QUESTIONS 1-9: 17
1. LITTLE INTEREST OR PLEASURE IN DOING THINGS: SEVERAL DAYS
SUM OF ALL RESPONSES TO PHQ QUESTIONS 1-9: 17
6. FEELING BAD ABOUT YOURSELF - OR THAT YOU ARE A FAILURE OR HAVE LET YOURSELF OR YOUR FAMILY DOWN: MORE THAN HALF THE DAYS
9. THOUGHTS THAT YOU WOULD BE BETTER OFF DEAD, OR OF HURTING YOURSELF: NOT AT ALL
10. IF YOU CHECKED OFF ANY PROBLEMS, HOW DIFFICULT HAVE THESE PROBLEMS MADE IT FOR YOU TO DO YOUR WORK, TAKE CARE OF THINGS AT HOME, OR GET ALONG WITH OTHER PEOPLE: EXTREMELY DIFFICULT
3. TROUBLE FALLING OR STAYING ASLEEP: NEARLY EVERY DAY
SUM OF ALL RESPONSES TO PHQ QUESTIONS 1-9: 17
SUM OF ALL RESPONSES TO PHQ QUESTIONS 1-9: 17
5. POOR APPETITE OR OVEREATING: NEARLY EVERY DAY
2. FEELING DOWN, DEPRESSED OR HOPELESS: MORE THAN HALF THE DAYS
7. TROUBLE CONCENTRATING ON THINGS, SUCH AS READING THE NEWSPAPER OR WATCHING TELEVISION: MORE THAN HALF THE DAYS
4. FEELING TIRED OR HAVING LITTLE ENERGY: MORE THAN HALF THE DAYS

## 2025-04-24 ASSESSMENT — LIFESTYLE VARIABLES
HOW MANY STANDARD DRINKS CONTAINING ALCOHOL DO YOU HAVE ON A TYPICAL DAY: PATIENT DOES NOT DRINK
HOW OFTEN DO YOU HAVE A DRINK CONTAINING ALCOHOL: NEVER

## 2025-04-24 ASSESSMENT — SLEEP AND FATIGUE QUESTIONNAIRES
DO YOU HAVE DIFFICULTY SLEEPING: YES
SLEEP PATTERN: DISTURBED/INTERRUPTED SLEEP;RESTLESSNESS
DO YOU USE A SLEEP AID: NO

## 2025-04-24 ASSESSMENT — PAIN - FUNCTIONAL ASSESSMENT: PAIN_FUNCTIONAL_ASSESSMENT: NONE - DENIES PAIN

## 2025-04-24 NOTE — TELEPHONE ENCOUNTER
Patient called to check on the status of this encounter. He wonders if he would be able to proceed in scheduling? Mentions if this provider is unable to accept new patients, he would be willing to see another provider as well. Patient has been in contact with PCP as well. Staff encouraged patient to have updated referral sent for provider to review in the meantime. Patient will contact PCP.

## 2025-04-24 NOTE — ED PROVIDER NOTES
[facility] EMERGENCY DEPARTMENT    EMERGENCY MEDICINE      Pt Name: Gagan Ford  MRN: [id number]  Birthdate 1985  Date of evaluation: 4/24/2025  Treating Physician: Dr. Calle  Resident Physician: Sari Guo MD    CHIEF COMPLAINT       Chief Complaint   Patient presents with    Anxiety     History obtained from chart review and the patient.    HISTORY OF PRESENT ILLNESS    HPI    Gagan Ford is a 40 y.o. male with PMH of GERD, anxiety presents to the emergency department for evaluation of anxiety.  Patient reported that he has had increased stress at work due to him feeling like his boss is demanding too much from him.  Patient also stated that his family is sick and he does not feel that he can take care of his family due to his increased work stress.  Patient reported he spoke with his PCP who increased his dose of Lexapro and prescribed him hydroxyzine.  He stated the increased dose of Lexapro has not helped him and he has not tried the hydroxyzine.  Patient is denying any suicidal ideations, homicidal ideations, pain, shortness of breath, nausea, vomiting, drugs, alcohol.    The patient has no other acute complaints at this time.    PAST MEDICAL AND SURGICAL HISTORY     Past Medical History:   Diagnosis Date    GERD (gastroesophageal reflux disease)        No past surgical history on file.    CURRENT MEDICATIONS     Discharge Medication List as of 4/24/2025 11:08 AM        CONTINUE these medications which have NOT CHANGED    Details   escitalopram (LEXAPRO) 10 MG tablet Take 1 tablet by mouth daily, Disp-90 tablet, R-1Normal      esomeprazole (NEXIUM) 40 MG delayed release capsule Take 40 mg by mouth every morning (before breakfast)Historical Med             ALLERGIES   No Known Allergies    FAMILY HISTORY     Family History   Problem Relation Age of Onset    Depression Mother     Osteoarthritis Father     Irritable Bowel Syndrome Father     Depression Brother        SOCIAL HISTORY

## 2025-04-24 NOTE — ED TRIAGE NOTES
Pt presents to the ER with c/o increased anxiety since Friday. Pt states his family is traveling and he started a new job. Pt reports taking a double dose of his Lexapro since Saturday but it has not helped. Pt denies SI or HI. Pt is alert and oriented, respirations even and unlabored. VSS

## 2025-05-07 ENCOUNTER — TELEMEDICINE (OUTPATIENT)
Dept: PSYCHIATRY | Age: 40
End: 2025-05-07

## 2025-05-07 DIAGNOSIS — F41.1 GENERALIZED ANXIETY DISORDER: Primary | ICD-10-CM

## 2025-05-07 PROCEDURE — 90792 PSYCH DIAG EVAL W/MED SRVCS: CPT | Performed by: NURSE PRACTITIONER

## 2025-05-07 RX ORDER — BUSPIRONE HYDROCHLORIDE 5 MG/1
5 TABLET ORAL 2 TIMES DAILY
COMMUNITY

## 2025-05-07 ASSESSMENT — PATIENT HEALTH QUESTIONNAIRE - PHQ9
7. TROUBLE CONCENTRATING ON THINGS, SUCH AS READING THE NEWSPAPER OR WATCHING TELEVISION: SEVERAL DAYS
8. MOVING OR SPEAKING SO SLOWLY THAT OTHER PEOPLE COULD HAVE NOTICED. OR THE OPPOSITE - BEING SO FIDGETY OR RESTLESS THAT YOU HAVE BEEN MOVING AROUND A LOT MORE THAN USUAL: NOT AT ALL
6. FEELING BAD ABOUT YOURSELF - OR THAT YOU ARE A FAILURE OR HAVE LET YOURSELF OR YOUR FAMILY DOWN: SEVERAL DAYS
10. IF YOU CHECKED OFF ANY PROBLEMS, HOW DIFFICULT HAVE THESE PROBLEMS MADE IT FOR YOU TO DO YOUR WORK, TAKE CARE OF THINGS AT HOME, OR GET ALONG WITH OTHER PEOPLE: SOMEWHAT DIFFICULT
2. FEELING DOWN, DEPRESSED OR HOPELESS: NOT AT ALL
5. POOR APPETITE OR OVEREATING: SEVERAL DAYS
SUM OF ALL RESPONSES TO PHQ QUESTIONS 1-9: 5
9. THOUGHTS THAT YOU WOULD BE BETTER OFF DEAD, OR OF HURTING YOURSELF: NOT AT ALL
2. FEELING DOWN, DEPRESSED OR HOPELESS: NOT AT ALL
SUM OF ALL RESPONSES TO PHQ QUESTIONS 1-9: 5
3. TROUBLE FALLING OR STAYING ASLEEP: SEVERAL DAYS
3. TROUBLE FALLING OR STAYING ASLEEP: SEVERAL DAYS
6. FEELING BAD ABOUT YOURSELF - OR THAT YOU ARE A FAILURE OR HAVE LET YOURSELF OR YOUR FAMILY DOWN: SEVERAL DAYS
7. TROUBLE CONCENTRATING ON THINGS, SUCH AS READING THE NEWSPAPER OR WATCHING TELEVISION: SEVERAL DAYS
8. MOVING OR SPEAKING SO SLOWLY THAT OTHER PEOPLE COULD HAVE NOTICED. OR THE OPPOSITE, BEING SO FIGETY OR RESTLESS THAT YOU HAVE BEEN MOVING AROUND A LOT MORE THAN USUAL: NOT AT ALL
4. FEELING TIRED OR HAVING LITTLE ENERGY: SEVERAL DAYS
9. THOUGHTS THAT YOU WOULD BE BETTER OFF DEAD, OR OF HURTING YOURSELF: NOT AT ALL
5. POOR APPETITE OR OVEREATING: SEVERAL DAYS
SUM OF ALL RESPONSES TO PHQ QUESTIONS 1-9: 5
10. IF YOU CHECKED OFF ANY PROBLEMS, HOW DIFFICULT HAVE THESE PROBLEMS MADE IT FOR YOU TO DO YOUR WORK, TAKE CARE OF THINGS AT HOME, OR GET ALONG WITH OTHER PEOPLE: SOMEWHAT DIFFICULT
SUM OF ALL RESPONSES TO PHQ QUESTIONS 1-9: 5
1. LITTLE INTEREST OR PLEASURE IN DOING THINGS: NOT AT ALL
4. FEELING TIRED OR HAVING LITTLE ENERGY: SEVERAL DAYS
SUM OF ALL RESPONSES TO PHQ QUESTIONS 1-9: 5
1. LITTLE INTEREST OR PLEASURE IN DOING THINGS: NOT AT ALL

## 2025-05-07 ASSESSMENT — ANXIETY QUESTIONNAIRES
1. FEELING NERVOUS, ANXIOUS, OR ON EDGE: SEVERAL DAYS
4. TROUBLE RELAXING: SEVERAL DAYS
3. WORRYING TOO MUCH ABOUT DIFFERENT THINGS: SEVERAL DAYS
6. BECOMING EASILY ANNOYED OR IRRITABLE: NOT AT ALL
4. TROUBLE RELAXING: SEVERAL DAYS
1. FEELING NERVOUS, ANXIOUS, OR ON EDGE: SEVERAL DAYS
3. WORRYING TOO MUCH ABOUT DIFFERENT THINGS: SEVERAL DAYS
2. NOT BEING ABLE TO STOP OR CONTROL WORRYING: SEVERAL DAYS
2. NOT BEING ABLE TO STOP OR CONTROL WORRYING: SEVERAL DAYS
7. FEELING AFRAID AS IF SOMETHING AWFUL MIGHT HAPPEN: SEVERAL DAYS
GAD7 TOTAL SCORE: 5
7. FEELING AFRAID AS IF SOMETHING AWFUL MIGHT HAPPEN: SEVERAL DAYS
6. BECOMING EASILY ANNOYED OR IRRITABLE: NOT AT ALL
5. BEING SO RESTLESS THAT IT IS HARD TO SIT STILL: NOT AT ALL
5. BEING SO RESTLESS THAT IT IS HARD TO SIT STILL: NOT AT ALL

## 2025-05-07 NOTE — PROGRESS NOTES
[facility] PROFESSIONAL [facility] PSYCHIAT[facility]  [address]  [facility] [place] [address]  Dept: [phone]  Dept Fax: [phone]  Loc: [phone]    Visit Date: 5/7/2025    Gagan Ford, was evaluated through a synchronous (real-time) audio-video encounter. The patient (or guardian if applicable) is aware that this is a billable service, which includes applicable co-pays. This Virtual Visit was conducted with patient's (and/or legal guardian's) consent. Patient identification was verified, and a caregiver was present when appropriate.   The patient was located at Home: [address] [place] [id number]  Provider was located at Home (Appt Dept State): [place]      SUBJECTIVE DATA     CHIEF COMPLAINT:    Chief Complaint   Patient presents with    Anxiety       History obtained from: patient    HISTORY OF PRESENT ILLNESS:    Gagan Ford is a 40 y.o. male who presents to the office via telehealth video to re-establish care for complaints of anxiety. Patient's last visit was 10/12/2021.    States he had been having a lot of increased anxiety when he reached out to re-establish care. States he also had a lot of stressors.     Started a new job in Jan. 2025  -worked in the company until late April 2025  -wife and children took a trip to [place] so he was home alone  -he started having some problems with a boss  -states \"my anxiety started to spike and got really, really high\"    States anxiety was 8-9/10 at that time  -states he was having problems getting the anxiety under control  -he began to take the Lexapro 20mg (rather than what he was prescribed, which was Lexapro 10mg)  -the increased dose was very helpful    He did leave the company  -states \"I knew I wasn't happy\"  -recognized the stress of the job was increasing his anxiety    Currently unemployed  -looking for a new job  -has some guilt about not having a job    Saw his PCP who agreed with the Lexapro 20mg daily and restarted

## 2025-08-18 ENCOUNTER — TELEMEDICINE (OUTPATIENT)
Dept: PSYCHIATRY | Age: 40
End: 2025-08-18
Payer: COMMERCIAL

## 2025-08-18 DIAGNOSIS — F41.1 GENERALIZED ANXIETY DISORDER: Primary | ICD-10-CM

## 2025-08-18 PROCEDURE — 99213 OFFICE O/P EST LOW 20 MIN: CPT | Performed by: NURSE PRACTITIONER

## 2025-08-18 RX ORDER — ESCITALOPRAM OXALATE 20 MG/1
20 TABLET ORAL DAILY
Qty: 30 TABLET | Refills: 2 | Status: SHIPPED | OUTPATIENT
Start: 2025-08-18

## 2025-08-18 RX ORDER — BUSPIRONE HYDROCHLORIDE 10 MG/1
10 TABLET ORAL 2 TIMES DAILY
Qty: 60 TABLET | Refills: 2 | Status: SHIPPED | OUTPATIENT
Start: 2025-08-18

## 2025-08-18 RX ORDER — HYDROXYZINE PAMOATE 25 MG/1
25 CAPSULE ORAL 3 TIMES DAILY PRN
Qty: 30 CAPSULE | Refills: 0 | Status: SHIPPED | OUTPATIENT
Start: 2025-08-18

## 2025-08-18 ASSESSMENT — ANXIETY QUESTIONNAIRES
6. BECOMING EASILY ANNOYED OR IRRITABLE: SEVERAL DAYS
2. NOT BEING ABLE TO STOP OR CONTROL WORRYING: NOT AT ALL
GAD7 TOTAL SCORE: 5
4. TROUBLE RELAXING: SEVERAL DAYS
3. WORRYING TOO MUCH ABOUT DIFFERENT THINGS: SEVERAL DAYS
IF YOU CHECKED OFF ANY PROBLEMS ON THIS QUESTIONNAIRE, HOW DIFFICULT HAVE THESE PROBLEMS MADE IT FOR YOU TO DO YOUR WORK, TAKE CARE OF THINGS AT HOME, OR GET ALONG WITH OTHER PEOPLE: SOMEWHAT DIFFICULT
2. NOT BEING ABLE TO STOP OR CONTROL WORRYING: NOT AT ALL
IF YOU CHECKED OFF ANY PROBLEMS ON THIS QUESTIONNAIRE, HOW DIFFICULT HAVE THESE PROBLEMS MADE IT FOR YOU TO DO YOUR WORK, TAKE CARE OF THINGS AT HOME, OR GET ALONG WITH OTHER PEOPLE: SOMEWHAT DIFFICULT
3. WORRYING TOO MUCH ABOUT DIFFERENT THINGS: SEVERAL DAYS
5. BEING SO RESTLESS THAT IT IS HARD TO SIT STILL: NOT AT ALL
5. BEING SO RESTLESS THAT IT IS HARD TO SIT STILL: NOT AT ALL
7. FEELING AFRAID AS IF SOMETHING AWFUL MIGHT HAPPEN: SEVERAL DAYS
6. BECOMING EASILY ANNOYED OR IRRITABLE: SEVERAL DAYS
1. FEELING NERVOUS, ANXIOUS, OR ON EDGE: SEVERAL DAYS
4. TROUBLE RELAXING: SEVERAL DAYS
7. FEELING AFRAID AS IF SOMETHING AWFUL MIGHT HAPPEN: SEVERAL DAYS
1. FEELING NERVOUS, ANXIOUS, OR ON EDGE: SEVERAL DAYS

## 2025-08-18 ASSESSMENT — PATIENT HEALTH QUESTIONNAIRE - PHQ9
SUM OF ALL RESPONSES TO PHQ QUESTIONS 1-9: 2
10. IF YOU CHECKED OFF ANY PROBLEMS, HOW DIFFICULT HAVE THESE PROBLEMS MADE IT FOR YOU TO DO YOUR WORK, TAKE CARE OF THINGS AT HOME, OR GET ALONG WITH OTHER PEOPLE: NOT DIFFICULT AT ALL
2. FEELING DOWN, DEPRESSED OR HOPELESS: NOT AT ALL
SUM OF ALL RESPONSES TO PHQ QUESTIONS 1-9: 2
SUM OF ALL RESPONSES TO PHQ QUESTIONS 1-9: 2
3. TROUBLE FALLING OR STAYING ASLEEP: NOT AT ALL
SUM OF ALL RESPONSES TO PHQ QUESTIONS 1-9: 2
9. THOUGHTS THAT YOU WOULD BE BETTER OFF DEAD, OR OF HURTING YOURSELF: NOT AT ALL
1. LITTLE INTEREST OR PLEASURE IN DOING THINGS: SEVERAL DAYS
7. TROUBLE CONCENTRATING ON THINGS, SUCH AS READING THE NEWSPAPER OR WATCHING TELEVISION: NOT AT ALL
3. TROUBLE FALLING OR STAYING ASLEEP: NOT AT ALL
9. THOUGHTS THAT YOU WOULD BE BETTER OFF DEAD, OR OF HURTING YOURSELF: NOT AT ALL
5. POOR APPETITE OR OVEREATING: NOT AT ALL
6. FEELING BAD ABOUT YOURSELF - OR THAT YOU ARE A FAILURE OR HAVE LET YOURSELF OR YOUR FAMILY DOWN: SEVERAL DAYS
8. MOVING OR SPEAKING SO SLOWLY THAT OTHER PEOPLE COULD HAVE NOTICED. OR THE OPPOSITE, BEING SO FIGETY OR RESTLESS THAT YOU HAVE BEEN MOVING AROUND A LOT MORE THAN USUAL: NOT AT ALL
4. FEELING TIRED OR HAVING LITTLE ENERGY: NOT AT ALL
7. TROUBLE CONCENTRATING ON THINGS, SUCH AS READING THE NEWSPAPER OR WATCHING TELEVISION: NOT AT ALL
SUM OF ALL RESPONSES TO PHQ QUESTIONS 1-9: 2
8. MOVING OR SPEAKING SO SLOWLY THAT OTHER PEOPLE COULD HAVE NOTICED. OR THE OPPOSITE - BEING SO FIDGETY OR RESTLESS THAT YOU HAVE BEEN MOVING AROUND A LOT MORE THAN USUAL: NOT AT ALL
10. IF YOU CHECKED OFF ANY PROBLEMS, HOW DIFFICULT HAVE THESE PROBLEMS MADE IT FOR YOU TO DO YOUR WORK, TAKE CARE OF THINGS AT HOME, OR GET ALONG WITH OTHER PEOPLE: NOT DIFFICULT AT ALL
6. FEELING BAD ABOUT YOURSELF - OR THAT YOU ARE A FAILURE OR HAVE LET YOURSELF OR YOUR FAMILY DOWN: SEVERAL DAYS
5. POOR APPETITE OR OVEREATING: NOT AT ALL
1. LITTLE INTEREST OR PLEASURE IN DOING THINGS: SEVERAL DAYS
4. FEELING TIRED OR HAVING LITTLE ENERGY: NOT AT ALL
2. FEELING DOWN, DEPRESSED OR HOPELESS: NOT AT ALL